# Patient Record
Sex: FEMALE | Race: WHITE | Employment: OTHER | URBAN - METROPOLITAN AREA
[De-identification: names, ages, dates, MRNs, and addresses within clinical notes are randomized per-mention and may not be internally consistent; named-entity substitution may affect disease eponyms.]

---

## 2017-06-20 ENCOUNTER — HOSPITAL ENCOUNTER (OUTPATIENT)
Dept: MAMMOGRAPHY | Age: 72
Discharge: HOME | End: 2017-06-20
Attending: INTERNAL MEDICINE
Payer: MEDICARE

## 2017-06-20 DIAGNOSIS — Z12.31 VISIT FOR SCREENING MAMMOGRAM: ICD-10-CM

## 2017-06-20 PROCEDURE — 77067 SCR MAMMO BI INCL CAD: CPT

## 2018-11-12 ENCOUNTER — OFFICE VISIT (OUTPATIENT)
Dept: OBGYN CLINIC | Age: 73
End: 2018-11-12

## 2018-11-12 VITALS
SYSTOLIC BLOOD PRESSURE: 130 MMHG | BODY MASS INDEX: 24.11 KG/M2 | DIASTOLIC BLOOD PRESSURE: 80 MMHG | HEIGHT: 62 IN | WEIGHT: 131 LBS

## 2018-11-12 DIAGNOSIS — N81.4 UTERINE PROLAPSE: Primary | ICD-10-CM

## 2018-11-12 NOTE — PATIENT INSTRUCTIONS
Uterine Prolapse: Care Instructions  Your Care Instructions    When the uterus moves down in the pelvis and starts to press into the vagina, it is called uterine prolapse. This happens when the pelvic muscles and tissues get weak. Women often have this problem after they have children or when they get older. It can also happen if you are overweight or you have a long-term cough. These put extra pressure on the uterus. This problem may cause you to leak urine. Or you may have trouble passing urine or stool. You may feel pain during sex. But in most cases, prolapse doesn't cause more serious health problems. You may feel better if you change how you do some of your daily activities. And you can try exercises to make your pelvic muscles strong. But if these don't help, you may want to talk with your doctor about surgery. Follow-up care is a key part of your treatment and safety. Be sure to make and go to all appointments, and call your doctor if you are having problems. It's also a good idea to know your test results and keep a list of the medicines you take. How can you care for yourself at home? · Do not do activities that put pressure on your pelvic muscles. This includes heavy lifting and straining. · Do exercises to tighten and strengthen your pelvic muscles. These are called Kegel exercises. To do them:  ? Squeeze the muscles you use to stop urine. Your belly and thighs should not move. ? Hold the squeeze for 3 seconds. Then relax for 3 seconds. ? Start with 3 seconds. Then add 1 second each week until you are able to squeeze for 10 seconds. ? Repeat this 10 to 15 times. Do these exercises 3 or more times a day. · Take an over-the-counter pain medicine, such as acetaminophen (Tylenol), ibuprofen (Advil, Motrin), or naproxen (Aleve), to relieve pain. Read and follow all instructions on the label. · Do not take two or more pain medicines at the same time unless the doctor told you to.  Many pain medicines have acetaminophen, which is Tylenol. Too much acetaminophen (Tylenol) can be harmful. · Talk with your doctor about a vaginal pessary. This is a device that you put in your vagina to support the uterus. Your doctor can teach you how and when to remove it. You will also learn how to clean it and put it back in.  · If your doctor prescribes estrogen cream for your vagina, use it exactly as prescribed. · To relieve pressure on your vagina, lie down and put a pillow under your knees. Or you can lie on your side and bring your knees up to your chest.  · If you are overweight, talk to your doctor about safe ways to lose weight. When should you call for help? Watch closely for changes in your health, and be sure to contact your doctor if:    · You have new urinary symptoms. These may include leaking urine, having pain when urinating, or feeling like you need to urinate often.     · You have trouble passing stool.     · You have pain or a feeling of fullness in your vagina.     · You do not get better as expected. Where can you learn more? Go to http://stephanie-pattie.info/. Enter Q847 in the search box to learn more about \"Uterine Prolapse: Care Instructions. \"  Current as of: May 15, 2018  Content Version: 11.8  © 2247-6264 [employer]. Care instructions adapted under license by [employer] (which disclaims liability or warranty for this information). If you have questions about a medical condition or this instruction, always ask your healthcare professional. [place] [address] any warranty or liability for your use of this information.

## 2018-11-12 NOTE — PROGRESS NOTES
Prolapse       CC: bulge in vagina    HPI: Ms. Hazel Cruz is a 72 y.o. No obstetric history on file. female presenting for evaluation of vaginal pressure. Denies stress/urge incontinence. She has not had previous evaluation/treatment for this problem with me. She has no additional complaints. HPI:  Onset: 2 weeks ago  Location: vaginal  Quality: none  Severity: none  Timing: none  Context: none  Modifying factors: none  Associated symptoms: none      OB History     No data available          Past Medical History:   Diagnosis Date    [facility]    CVA 11/05/06    R hemiparesis; MRA L vertebral stenosis- carotids ok    Depression     FH: colon cancer 6/5/2012    Hypertension     Osteoporosis     post  menopausal    Stroke [facility])     right hand weakness       Past Surgical History:   Procedure Laterality Date    BREAST SURGERY PROCEDURE UNLISTED      HX GI      colonoscopy 4/01; repeat 4/0698-[id number] check hemoccults    HX GYN      post menopausal; R breast surgery 5/1985 - [facility]; T&A    KARSON BIOPSY BREAST STEREOTACTIC Bilateral     benign years ago    CO COLONOSCOPY FLX DX W/COLLJ SPEC WHEN PFRMD  6/28/2012            Family History   Problem Relation Age of Onset    Cancer Mother        Social History     Socioeconomic History    Marital status:      Spouse name: Not on file    Number of children: Not on file    Years of education: Not on file    Highest education level: Not on file   Social Needs    Financial resource strain: Not on file    Food insecurity - worry: Not on file    Food insecurity - inability: Not on file   [employer] needs - medical: Not on file   [employer] needs - non-medical: Not on file   Occupational History    Not on file   Tobacco Use    Smoking status: Current Every Day Smoker     Packs/day: 0.50     Types: Cigarettes    Smokeless tobacco: Never Used   Substance and Sexual Activity    Alcohol use:  Yes Comment: ocassional    Drug use: No    Sexual activity: Not on file   Other Topics Concern    Not on file   Social History Narrative    Not on file       Current Outpatient Medications   Medication Sig Dispense Refill    citalopram (CELEXA) 40 mg tablet TAKE ONE TABLET EVERY DAY 30 tablet 0    lisinopril (PRINIVIL, ZESTRIL) 10 mg tablet TAKE ONE TABLET BY MOUTH EVERY DAY 30 Tab 9    HYDROcodone-acetaminophen (NORCO) 5-325 mg per tablet Take 1-2 Tabs by mouth every six (6) hours as needed for Pain. 30 Tab 0    acetaminophen-codeine (TYLENOL-CODEINE #3) 300-30 mg per tablet Take 1-2 Tabs by mouth every six (6) hours as needed for Pain. 30 Tab 0    buPROPion SR (WELLBUTRIN SR) 150 mg SR tablet Take 1 Tab by mouth as directed. 1 daily x 3 days then bid- start 1 week prior to quit date 60 Tab 2    naproxen (NAPROSYN) 375 mg tablet Take 1 Tab by mouth two (2) times daily (with meals). 20 Tab 0    IBUPROFEN PO Take 2 Tabs by mouth two (2) times daily as needed.       CHERATUSSIN AC  mg/5 mL solution TAKE TWO TEASPOONSFUL (10ML) BY MOUTH THREE TIMES DAILY AS NEEDED FOR COUGH 180 mL 0       Allergies   Allergen Reactions    Percocet [Oxycodone-Acetaminophen] Nausea and Vomiting       Review of Systems - History obtained from the patient  Constitutional: negative for weight loss, fever, night sweats  HEENT: negative for hearing loss, earache, congestion, snoring, sorethroat  CV: negative for chest pain, palpitations, edema  Resp: negative for cough, shortness of breath, wheezing  GI: negative for change in bowel habits, abdominal pain, black or bloody stools  : negative for frequency, dysuria, hematuria, vaginal discharge  MSK: negative for back pain, joint pain, muscle pain  Skin :negative for itching, rash, hives  Neuro: negative for dizziness, headache, confusion, weakness  Psych: negative for anxiety, depression, change in mood  Heme/lymph: negative for bleeding, bruising, pallor    Physical Exam    Visit Vitals  /80 (BP 1 Location: Left arm, BP Patient Position: Sitting)   Ht 5' 2\" (1.575 m)   Wt 131 lb (59.4 kg)   BMI 23.96 kg/m²       HENT  · Head and Face: appears normal    Neck  · Inspection/Palpation: normal appearance, no masses or tenderness  · Lymph Nodes: no lymphadenopathy present  · Thyroid: gland size normal, nontender, no nodules or masses present on palpation    Chest  · Respiratory Effort: non-labored breathing  · Auscultation: Clear to auscultation bilaterlly, normal breath sounds    Cardiovascular  · Heart:  · Auscultation: regular rate and rhythm without murmur  · Extremities: no peripheral edema    Gastrointestinal  · Abdominal Examination: abdomen non-tender to palpation, normal bowel sounds, no masses present  · Liver and spleen: no hepatomegaly present, spleen not palpable  · Hernias: no hernias identified    Genitourinary  · External Genitalia: normal appearance for age, no discharge present, no tenderness present, no inflammatory lesions present, no masses present, no atrophy present  · Vagina: normal vaginal vault without central or paravaginal defects, no inflammatory lesions present, no masses present, no discharge present   · Bladder: non-tender to palpation  · Urethra: appears normal  · Cervix: normal, no cervicitis, no CMT  · Uterus: normal size, shape and consistency, mobile, APICAL/STAGE I PROLAPSE  · Adnexa: no adnexal tenderness present, no adnexal masses present  · Perineum: perineum within normal limits, no evidence of trauma, no rashes or skin lesions present    Skin  · General Inspection: no rash, no lesions identified    Neurologic/Psychiatric  · Mental Status:  · Orientation: grossly oriented to person, place and time  · Mood and Affect: mood normal, affect appropriate    Results for orders placed or performed in visit on 07/22/13   VITAMIN D, 25 HYDROXY   Result Value Ref Range    VITAMIN D, 25-HYDROXY 29.4 (L) 30.0 - 692.4 ng/mL   METABOLIC PANEL, BASIC Result Value Ref Range    Glucose 72 65 - 99 mg/dL    BUN 12 8 - 27 mg/dL    Creatinine 0.74 0.57 - 1.00 mg/dL    GFR est non-AA 85 >59 mL/min/1.73    eGFR If  98 >59 mL/min/1.73    BUN/Creatinine ratio 16 11 - 26    Sodium 136 134 - 144 mmol/L    Potassium 4.6 3.5 - 5.2 mmol/L    Chloride 97 97 - 108 mmol/L    CO2 22 19 - 28 mmol/L    Calcium 10.2 8.6 - 10.2 mg/dL         Assessment/Plan:  Discussed mild uterine prolapse. Discussed options for mgmt including expectant v pessary v surgical mgmt. Discussed risks, benefits, alternatives to all. She is leaning toward surgery. Handouts provided, she would like to read about prolapse and think about options, she will call us when she decides. RTC: for annual , or sooner prn for problems or concerns. Handouts and instructions provided. Silviano Castro MD     Visit time 30min. More than 50% of my face-to-face time was spend on patient counseling.

## 2018-11-16 ENCOUNTER — TELEPHONE (OUTPATIENT)
Dept: OBGYN CLINIC | Age: 73
End: 2018-11-16

## 2018-11-16 NOTE — TELEPHONE ENCOUNTER
Message left at 9:27am      72year old patient last seen in the office on 11/12/18. Patient left message to say that she would rather have the laparoscopic surgery management over the pessary. This nurse called the patient. Patient would MD to call her.   Patient can be reached at [phone]( cell phone

## 2018-11-21 ENCOUNTER — TELEPHONE (OUTPATIENT)
Dept: OBGYN CLINIC | Age: 73
End: 2018-11-21

## 2018-11-21 NOTE — TELEPHONE ENCOUNTER
Attempted to contact phone number the patient requested a call back on to refer her to Dr. Olga Santos at [facility] regarding her interest in surgery rather than the pessary option.     RAMYAB

## 2018-11-27 ENCOUNTER — TELEPHONE (OUTPATIENT)
Dept: OBGYN CLINIC | Age: 73
End: 2018-11-27

## 2018-11-27 NOTE — TELEPHONE ENCOUNTER
Call received at 827am    72year old patient last seen in the office on 11/21/18. Patient calling regarding her decision to have the surgery. Patient advised that Dr. Linda Herrera does not do the procedure. Patient advised to contact Dr. Rashawn Man, at [facility] center,   Patient provided with the phone number [phone]    Patient advised to check with her insurance. Patient verbalized understanding.